# Patient Record
Sex: FEMALE | Race: OTHER | NOT HISPANIC OR LATINO | URBAN - METROPOLITAN AREA
[De-identification: names, ages, dates, MRNs, and addresses within clinical notes are randomized per-mention and may not be internally consistent; named-entity substitution may affect disease eponyms.]

---
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
## 2023-06-22 NOTE — ED ADULT NURSE NOTE - SUICIDE SCREENING QUESTION 2
Impression: Nexdtve age-related mclr degn, left eye, intermed dry stage: H35.3122. OS. Plan: Exam remains stable without evidence of progression, and the OCT does not show evidence of IRF/SRF/SRH. The patient was advised to continue AREDS-2 multivitamins. The patient was also asked to continue Amsler monitoring, avoid smoking, wear UV protection, and call us immediately with any visual changes. No